# Patient Record
Sex: FEMALE | Race: WHITE | Employment: UNEMPLOYED | URBAN - METROPOLITAN AREA
[De-identification: names, ages, dates, MRNs, and addresses within clinical notes are randomized per-mention and may not be internally consistent; named-entity substitution may affect disease eponyms.]

---

## 2020-03-16 ENCOUNTER — PATIENT OUTREACH (OUTPATIENT)
Dept: FAMILY MEDICINE CLINIC | Age: 25
End: 2020-03-16

## 2021-05-14 ENCOUNTER — OFFICE VISIT (OUTPATIENT)
Dept: FAMILY MEDICINE CLINIC | Age: 26
End: 2021-05-14
Payer: MEDICAID

## 2021-05-14 VITALS
TEMPERATURE: 98.6 F | HEIGHT: 63 IN | BODY MASS INDEX: 18.04 KG/M2 | OXYGEN SATURATION: 96 % | RESPIRATION RATE: 16 BRPM | HEART RATE: 70 BPM | WEIGHT: 101.8 LBS | SYSTOLIC BLOOD PRESSURE: 118 MMHG | DIASTOLIC BLOOD PRESSURE: 67 MMHG

## 2021-05-14 DIAGNOSIS — F32.0 CURRENT MILD EPISODE OF MAJOR DEPRESSIVE DISORDER WITHOUT PRIOR EPISODE (HCC): ICD-10-CM

## 2021-05-14 DIAGNOSIS — F41.1 GENERALIZED ANXIETY DISORDER: Primary | ICD-10-CM

## 2021-05-14 PROCEDURE — 36415 COLL VENOUS BLD VENIPUNCTURE: CPT | Performed by: FAMILY MEDICINE

## 2021-05-14 PROCEDURE — 99213 OFFICE O/P EST LOW 20 MIN: CPT | Performed by: FAMILY MEDICINE

## 2021-05-14 RX ORDER — VENLAFAXINE HYDROCHLORIDE 37.5 MG/1
37.5 CAPSULE, EXTENDED RELEASE ORAL DAILY
Qty: 60 CAP | Refills: 2 | Status: SHIPPED | OUTPATIENT
Start: 2021-05-14 | End: 2021-07-06

## 2021-05-14 NOTE — PROGRESS NOTES
1. Have you been to the ER, urgent care clinic since your last visit? Hospitalized since your last visit? No    2. Have you seen or consulted any other health care providers outside of the [address] [place] [address] since your last visit? Include any pap smears or colon screening. No     5/14/2021      Chief Complaint   Patient presents with    Anxiety     having trouble with day to day chores and parenting, feels grouchy and irritated         History of Present Illness:         is a 31 y.o. female with increasing nervous irritability, labile mood and anxiety. No depressed mood, no suicidal ideation. No previous episodes. Sleep OK, appetite poor. Libido depressed. Allergies   Allergen Reactions    Caffeine Anxiety     shaky    Doxycycline Other (comments)     Numbness/tingling and skin sensitivity from elbows and knees down. Current Outpatient Medications   Medication Sig    venlafaxine-SR (EFFEXOR-XR) 37.5 mg capsule Take 1 Cap by mouth daily. After two weeks increase to two tabs    norethindrone (MICRONOR) 0.35 mg tab Take  by mouth. No current facility-administered medications for this visit. Physical Examination:    Visit Vitals  /67 (BP 1 Location: Left upper arm, BP Patient Position: Sitting, BP Cuff Size: Adult)   Pulse 70   Temp 98.6 °F (37 °C) (Temporal)   Resp 16   Ht 5' 3\" (1.6 m)   Wt 101 lb 12.8 oz (46.2 kg)   LMP 05/05/2021   SpO2 96%   BMI 18.03 kg/m²      General:  Alert, cooperative, no distress. HEENT:  Normocephalic, without obvious abnormality, atraumatic. Conjunctivae/corneas clear. Pupils equal, round, reactive to light. Extraocular movements intact. TMs and external canals normal bilaterally. Nasal mucosa and oropharynx clear. Lungs: Clear to auscultation bilaterally. Chest wall:  No tenderness or deformity. Heart:  Regular rate and rhythm, S1, S2 normal, no murmur, click, rub, or gallop. Abdomen:   Soft, non-tender.  Bowel sounds normal. No masses. No organomegaly. Extremities: Extremities normal, atraumatic, no cyanosis or edema. Pulses: 2+ and symmetric all extremities. Skin: Skin color, texture, turgor normal. No rashes or lesions. Lymph nodes: Cervical, supraclavicular, and axillary nodes normal.   Neurologic: CNII-XII intact. Normal strength, sensation, and reflexes throughout. ASSESSMENT AND PLAN    1. Generalized anxiety disorder  Medication trial. Virtual follow up in 6-8 weeks. - venlafaxine-SR (EFFEXOR-XR) 37.5 mg capsule; Take 1 Cap by mouth daily. After two weeks increase to two tabs  Dispense: 60 Cap; Refill: 2  - [place] HANDLG&/OR CONVEY OF SPEC FOR TR OFFICE TO LAB  - COLLECTION VENOUS BLOOD,VENIPUNCTURE    2. Current mild episode of major depressive disorder without prior episode (HCC)    - venlafaxine-SR (EFFEXOR-XR) 37.5 mg capsule; Take 1 Cap by mouth daily. After two weeks increase to two tabs  Dispense: 60 Cap; Refill: 2  - TSH 3RD GENERATION; Future  - CBC WITH AUTOMATED DIFF; Future  - METABOLIC PANEL, COMPREHENSIVE; Future  - METABOLIC PANEL, COMPREHENSIVE  - CBC WITH AUTOMATED DIFF  - TSH 3RD GENERATION            Orders Placed This Encounter    TSH 3RD GENERATION     Standing Status:   Future     Number of Occurrences:   1     Standing Expiration Date:   5/14/2022    CBC WITH AUTOMATED DIFF     Standing Status:   Future     Number of Occurrences:   1     Standing Expiration Date:   6/46/0199    METABOLIC PANEL, COMPREHENSIVE     Standing Status:   Future     Number of Occurrences:   1     Standing Expiration Date:   5/14/2022    NV HANDLG&/OR CONVEY OF SPEC FOR TR OFFICE TO LAB    COLLECTION VENOUS BLOOD,VENIPUNCTURE    venlafaxine-SR (EFFEXOR-XR) 37.5 mg capsule     Sig: Take 1 Cap by mouth daily.  After two weeks increase to two tabs     Dispense:  60 Cap     Refill:  2       8 weeks    Toya Rushing MD

## 2021-05-15 LAB
ALBUMIN SERPL-MCNC: 4.4 G/DL (ref 3.5–5)
ALBUMIN/GLOB SERPL: 1.4 {RATIO} (ref 1.1–2.2)
ALP SERPL-CCNC: 76 U/L (ref 45–117)
ALT SERPL-CCNC: 10 U/L (ref 12–78)
ANION GAP SERPL CALC-SCNC: 4 MMOL/L (ref 5–15)
AST SERPL-CCNC: 15 U/L (ref 15–37)
BASOPHILS # BLD: 0 K/UL (ref 0–0.1)
BASOPHILS NFR BLD: 1 % (ref 0–1)
BILIRUB SERPL-MCNC: 0.5 MG/DL (ref 0.2–1)
BUN SERPL-MCNC: 6 MG/DL (ref 6–20)
BUN/CREAT SERPL: 10 (ref 12–20)
CALCIUM SERPL-MCNC: 9 MG/DL (ref 8.5–10.1)
CHLORIDE SERPL-SCNC: 108 MMOL/L (ref 97–108)
CO2 SERPL-SCNC: 28 MMOL/L (ref 21–32)
CREAT SERPL-MCNC: 0.58 MG/DL (ref 0.55–1.02)
DIFFERENTIAL METHOD BLD: NORMAL
EOSINOPHIL # BLD: 0.2 K/UL (ref 0–0.4)
EOSINOPHIL NFR BLD: 3 % (ref 0–7)
ERYTHROCYTE [DISTWIDTH] IN BLOOD BY AUTOMATED COUNT: 12.7 % (ref 11.5–14.5)
GLOBULIN SER CALC-MCNC: 3.2 G/DL (ref 2–4)
GLUCOSE SERPL-MCNC: 101 MG/DL (ref 65–100)
HCT VFR BLD AUTO: 38.9 % (ref 35–47)
HGB BLD-MCNC: 12.7 G/DL (ref 11.5–16)
IMM GRANULOCYTES # BLD AUTO: 0 K/UL (ref 0–0.04)
IMM GRANULOCYTES NFR BLD AUTO: 0 % (ref 0–0.5)
LYMPHOCYTES # BLD: 1.9 K/UL (ref 0.8–3.5)
LYMPHOCYTES NFR BLD: 33 % (ref 12–49)
MCH RBC QN AUTO: 30 PG (ref 26–34)
MCHC RBC AUTO-ENTMCNC: 32.6 G/DL (ref 30–36.5)
MCV RBC AUTO: 92 FL (ref 80–99)
MONOCYTES # BLD: 0.6 K/UL (ref 0–1)
MONOCYTES NFR BLD: 10 % (ref 5–13)
NEUTS SEG # BLD: 3.1 K/UL (ref 1.8–8)
NEUTS SEG NFR BLD: 53 % (ref 32–75)
NRBC # BLD: 0 K/UL (ref 0–0.01)
NRBC BLD-RTO: 0 PER 100 WBC
PLATELET # BLD AUTO: 285 K/UL (ref 150–400)
PMV BLD AUTO: 11.3 FL (ref 8.9–12.9)
POTASSIUM SERPL-SCNC: 3.7 MMOL/L (ref 3.5–5.1)
PROT SERPL-MCNC: 7.6 G/DL (ref 6.4–8.2)
RBC # BLD AUTO: 4.23 M/UL (ref 3.8–5.2)
SODIUM SERPL-SCNC: 140 MMOL/L (ref 136–145)
TSH SERPL DL<=0.05 MIU/L-ACNC: 1.35 UIU/ML (ref 0.36–3.74)
WBC # BLD AUTO: 5.8 K/UL (ref 3.6–11)

## 2021-05-17 ENCOUNTER — TELEPHONE (OUTPATIENT)
Dept: FAMILY MEDICINE CLINIC | Age: 26
End: 2021-05-17

## 2021-05-17 NOTE — TELEPHONE ENCOUNTER
Patient calls to report that she has been taking the 37.5 mg of Effexor. She has felt like garbage everyday since she started it. She feels like she is in a fog, has had a headache/migraine. She has not been able to get out of the bed for 3 days. She wants to know if this is normal and should she continue taking it. I have talked to Dr Benigno Dill about this situation. He wants the patient to try to continue the medication for the next 3 days to see if the SE's go away. If not, she should call the office back and he will consider changing the RX.

## 2021-07-06 ENCOUNTER — VIRTUAL VISIT (OUTPATIENT)
Dept: FAMILY MEDICINE CLINIC | Age: 26
End: 2021-07-06
Payer: MEDICAID

## 2021-07-06 DIAGNOSIS — F32.0 CURRENT MILD EPISODE OF MAJOR DEPRESSIVE DISORDER WITHOUT PRIOR EPISODE (HCC): ICD-10-CM

## 2021-07-06 DIAGNOSIS — F41.1 GENERALIZED ANXIETY DISORDER: ICD-10-CM

## 2021-07-06 PROCEDURE — 99442 PR PHYS/QHP TELEPHONE EVALUATION 11-20 MIN: CPT | Performed by: FAMILY MEDICINE

## 2021-07-06 RX ORDER — VENLAFAXINE HYDROCHLORIDE 37.5 MG/1
37.5 CAPSULE, EXTENDED RELEASE ORAL DAILY
Qty: 90 CAPSULE | Refills: 1 | Status: SHIPPED | OUTPATIENT
Start: 2021-07-06 | End: 2022-01-07 | Stop reason: SDUPTHER

## 2021-07-06 NOTE — PROGRESS NOTES
Surinder Nazario is a 31 y.o. female, evaluated via audio-only technology on 7/6/2021 for Anxiety (2 month follow up )  . Assessment & Plan:   Diagnoses and all orders for this visit:    1. Generalized anxiety disorder  -     venlafaxine-SR (EFFEXOR-XR) 37.5 mg capsule; Take 1 Capsule by mouth daily. 2. Current mild episode of major depressive disorder without prior episode (HCC)  -     venlafaxine-SR (EFFEXOR-XR) 37.5 mg capsule; Take 1 Capsule by mouth daily. Continue low dose SSNRI for 6-12 months    12Cont  Subjective: Following up anxiety and depression after six weeks on venlafaxine. Feeling much better and did not have to increase to 75 mg. No side effects after some initial queeziness. Prior to Admission medications    Medication Sig Start Date End Date Taking? Authorizing Provider   venlafaxine-SR [facility] 37.5 mg capsule Take 1 Capsule by mouth daily. 7/6/21  Yes Amarilys Acevedo MD   norethindrone [facility]) 0.35 mg tab Take  by mouth. Yes Provider, Historical     There is no problem list on file for this patient. Current Outpatient Medications   Medication Sig Dispense Refill    venlafaxine-SR (EFFEXOR-XR) 37.5 mg capsule Take 1 Capsule by mouth daily. 90 Capsule 1    norethindrone (MICRONOR) 0.35 mg tab Take  by mouth. Allergies   Allergen Reactions    Caffeine Anxiety     shaky    Doxycycline Other (comments)     Numbness/tingling and skin sensitivity from elbows and knees down. ROS    No flowsheet data found. Surinder Nazario, who was evaluated through a patient-initiated, synchronous (real-time) audio only encounter, and/or her healthcare decision maker, is aware that it is a billable service, with coverage as determined by her insurance carrier. She provided verbal consent to proceed: Yes. She has not had a related appointment within my department in the past 7 days or scheduled within the next 24 hours.       Total Time: minutes: 11-20 minutes    Petra An Darin Lai MD 1. Have you been to the ER, urgent care clinic since your last visit? Hospitalized since your last visit? No    2. Have you seen or consulted any other health care providers outside of the [address] [place] [address] since your last visit? Include any pap smears or colon screening.  No

## 2021-12-30 ENCOUNTER — VIRTUAL VISIT (OUTPATIENT)
Dept: FAMILY MEDICINE CLINIC | Age: 26
End: 2021-12-30
Payer: MEDICAID

## 2021-12-30 DIAGNOSIS — J01.10 ACUTE NON-RECURRENT FRONTAL SINUSITIS: Primary | ICD-10-CM

## 2021-12-30 PROCEDURE — 99213 OFFICE O/P EST LOW 20 MIN: CPT | Performed by: PHYSICIAN ASSISTANT

## 2021-12-30 RX ORDER — AMOXICILLIN AND CLAVULANATE POTASSIUM 875; 125 MG/1; MG/1
1 TABLET, FILM COATED ORAL EVERY 12 HOURS
Qty: 14 TABLET | Refills: 0 | Status: SHIPPED | OUTPATIENT
Start: 2021-12-30 | End: 2022-01-06

## 2021-12-30 NOTE — PROGRESS NOTES
Marjorie Cohn is a 31 y.o. female who was seen by synchronous (real-time) audio-video technology on 12/30/2021 for Cough (x 1 week, congestion, dry, itchy throat, sinus ha, afebrile)        Assessment & Plan:   Diagnoses and all orders for this visit:    1. Acute non-recurrent frontal sinusitis  -     amoxicillin-clavulanate (AUGMENTIN) 875-125 mg per tablet; Take 1 Tablet by mouth every twelve (12) hours for 7 days. Encourage rest & fluids. Humidifier, warm compresses, nasal spray, decongestants. Discussed otc medications for symptomatic relief. RTO/seek medical attn if sxs persist/worsen or develops any additional sxs/concerns. The complexity of medical decision making for this visit is moderate         I spent at least 15 minutes on this visit with this established patient. Subjective:   Pt presents with 1 week, started last Mon-Wed, tongue/throat itchy and watched to scratch it. (though allergic rxn at first)  Then took Benadryl. fetl okay next day. A lot of blowing nose. Friday found out grandma had covid that her son had stayed with prior. Pt took at home COVID test Sat on [place] and it was negative. Suspects she has a sinus infection. Low of blowing yellow from nose. Had sinus HA earlier this morning. Sinus pressure and pain in eyes and right above eyebrows. Nose burning. Neti pot saline rinse helped a lot. Flonase also helping. Prior to Admission medications    Medication Sig Start Date End Date Taking? Authorizing Provider   venlafaxine-SR [facility] 37.5 mg capsule Take 1 Capsule by mouth daily. 7/6/21  Yes Og Barnes MD   [facility]) 0.35 mg tab Take  by mouth. Patient not taking: Reported on 12/30/2021    Provider, Historical     There is no problem list on file for this patient. There are no problems to display for this patient.     Current Outpatient Medications   Medication Sig Dispense Refill    amoxicillin-clavulanate (AUGMENTIN) 875-125 mg per tablet Take 1 Tablet by mouth every twelve (12) hours for 7 days. 14 Tablet 0    venlafaxine-SR (EFFEXOR-XR) 37.5 mg capsule Take 1 Capsule by mouth daily. 90 Capsule 1    norethindrone (MICRONOR) 0.35 mg tab Take  by mouth. (Patient not taking: Reported on 12/30/2021)       Allergies   Allergen Reactions    Caffeine Anxiety     shaky    Doxycycline Other (comments)     Numbness/tingling and skin sensitivity from elbows and knees down. No past medical history on file. Past Surgical History:   Procedure Laterality Date    HX ORTHOPAEDIC Left     fatty cyst on hip     Family History   Problem Relation Age of Onset    Cancer Father         prostate    Alcohol abuse Father     Cancer Maternal Grandmother         breast     Social History     Tobacco Use    Smoking status: Never Smoker    Smokeless tobacco: Never Used   Substance Use Topics    Alcohol use: Yes       Just had a miscarriage last week. Review of Systems   Constitutional: Negative for chills and fever. HENT: Positive for congestion and sinus pain. Negative for ear pain and sore throat (only in AM from mouth breathing. ). Respiratory: Negative for cough and shortness of breath. Gastrointestinal: Negative. Musculoskeletal: Negative for myalgias. Neurological: Positive for headaches. Negative for dizziness. Objective:   No flowsheet data found.      [INSTRUCTIONS:  \"[x]\" Indicates a positive item  \"[]\" Indicates a negative item  -- DELETE ALL ITEMS NOT EXAMINED]    Constitutional: [x] Appears well-developed and well-nourished [x] No apparent distress      [] Abnormal -     Mental status: [x] Alert and awake  [x] Oriented to person/place/time [x] Able to follow commands    [] Abnormal -     Eyes:   EOM    [x]  Normal    [] Abnormal -   Sclera  [x]  Normal    [] Abnormal -          Discharge [x]  None visible   [] Abnormal -     HENT: [x] Normocephalic, atraumatic  [x] Abnormal - pain when palpates over frontal sinuses. [x] Mouth/Throat: Mucous membranes are moist    External Ears [x] Normal  [] Abnormal -    Neck: [x] No visualized mass [] Abnormal -     Pulmonary/Chest: [x] Respiratory effort normal   [x] No visualized signs of difficulty breathing or respiratory distress        [] Abnormal -      Musculoskeletal:   [x] Normal gait with no signs of ataxia         [x] Normal range of motion of neck        [] Abnormal -     Neurological:        [x] No Facial Asymmetry (Cranial nerve 7 motor function) (limited exam due to video visit)          [x] No gaze palsy        [] Abnormal -          Skin:        [x] No significant exanthematous lesions or discoloration noted on facial skin         [] Abnormal -            Psychiatric:       [x] Normal Affect [] Abnormal -        [x] No Hallucinations    Other pertinent observable physical exam findings:-        We discussed the expected course, resolution and complications of the diagnosis(es) in detail. Medication risks, benefits, costs, interactions, and alternatives were discussed as indicated. I advised her to contact the office if her condition worsens, changes or fails to improve as anticipated. She expressed understanding with the diagnosis(es) and plan. Sergio Garcia, was evaluated through a synchronous (real-time) audio-video encounter. The patient (or guardian if applicable) is aware that this is a billable service. Verbal consent to proceed has been obtained within the past 12 months. The visit was conducted pursuant to the emergency declaration under the [facility], [address] [place] [address] authority and the [employer] General Act. Patient identification was verified, and a caregiver was present when appropriate. The patient was located in a state where the provider was credentialed to provide care.       Kamala Reagan PA-C

## 2021-12-30 NOTE — PATIENT INSTRUCTIONS
Sinusitis: Care Instructions  Your Care Instructions     Sinusitis is an infection of the lining of the sinus cavities in your head. Sinusitis often follows a cold. It causes pain and pressure in your head and face. In most cases, sinusitis gets better on its own in 1 to 2 weeks. But some mild symptoms may last for several weeks. Sometimes antibiotics are needed. Follow-up care is a key part of your treatment and safety. Be sure to make and go to all appointments, and call your doctor if you are having problems. It's also a good idea to know your test results and keep a list of the medicines you take. How can you care for yourself at home? · Take an over-the-counter pain medicine, such as acetaminophen (Tylenol), ibuprofen (Advil, Motrin), or naproxen (Aleve). Read and follow all instructions on the label. · If the doctor prescribed antibiotics, take them as directed. Do not stop taking them just because you feel better. You need to take the full course of antibiotics. · Be careful when taking over-the-counter cold or flu medicines and Tylenol at the same time. Many of these medicines have acetaminophen, which is Tylenol. Read the labels to make sure that you are not taking more than the recommended dose. Too much acetaminophen (Tylenol) can be harmful. · Breathe warm, moist air from a steamy shower, a hot bath, or a sink filled with hot water. Avoid cold, dry air. Using a humidifier in your home may help. Follow the directions for cleaning the machine. · Use saline (saltwater) nasal washes. This can help keep your nasal passages open and wash out mucus and bacteria. You can buy saline nose drops at a grocery store or drugstore. Or you can make your own at home by adding 1 teaspoon of salt and 1 teaspoon of baking soda to 2 cups of distilled water. If you make your own, fill a bulb syringe with the solution, insert the tip into your nostril, and squeeze gently. Rometta Mering your nose.   · Put a hot, wet towel or a warm gel pack on your face 3 or 4 times a day for 5 to 10 minutes each time. · Try a decongestant nasal spray like oxymetazoline (Afrin). Do not use it for more than 3 days in a row. Using it for more than 3 days can make your congestion worse. When should you call for help? Call your doctor now or seek immediate medical care if:    · You have new or worse swelling or redness in your face or around your eyes.     · You have a new or higher fever. Watch closely for changes in your health, and be sure to contact your doctor if:    · You have new or worse facial pain.     · The mucus from your nose becomes thicker (like pus) or has new blood in it.     · You are not getting better as expected. Where can you learn more? Go to http://www.gray.com/  Enter B566 in the search box to learn more about \"Sinusitis: Care Instructions. \"  Current as of: December 2, 2020               Content Version: 13.0  © 2006-2021 [employer]. Care instructions adapted under license by [employer] (which disclaims liability or warranty for this information). If you have questions about a medical condition or this instruction, always ask your healthcare professional. [place] [address] any warranty or liability for your use of this information.

## 2022-01-07 DIAGNOSIS — F41.1 GENERALIZED ANXIETY DISORDER: ICD-10-CM

## 2022-01-07 DIAGNOSIS — F32.0 CURRENT MILD EPISODE OF MAJOR DEPRESSIVE DISORDER WITHOUT PRIOR EPISODE (HCC): ICD-10-CM

## 2022-01-07 RX ORDER — VENLAFAXINE HYDROCHLORIDE 37.5 MG/1
37.5 CAPSULE, EXTENDED RELEASE ORAL DAILY
Qty: 90 CAPSULE | Refills: 1 | Status: SHIPPED | OUTPATIENT
Start: 2022-01-07 | End: 2022-06-22 | Stop reason: ALTCHOICE

## 2022-01-07 NOTE — TELEPHONE ENCOUNTER
Pt calls for   Requested Prescriptions     Pending Prescriptions Disp Refills    venlafaxine-SR (EFFEXOR-XR) 37.5 mg capsule 90 Capsule 1     Sig: Take 1 Capsule by mouth daily. Pt thought she still had refills available.

## 2022-06-22 ENCOUNTER — OFFICE VISIT (OUTPATIENT)
Dept: FAMILY MEDICINE CLINIC | Age: 27
End: 2022-06-22
Payer: MEDICAID

## 2022-06-22 VITALS
WEIGHT: 109 LBS | TEMPERATURE: 97 F | HEIGHT: 63 IN | SYSTOLIC BLOOD PRESSURE: 108 MMHG | OXYGEN SATURATION: 97 % | RESPIRATION RATE: 18 BRPM | HEART RATE: 95 BPM | DIASTOLIC BLOOD PRESSURE: 71 MMHG | BODY MASS INDEX: 19.31 KG/M2

## 2022-06-22 DIAGNOSIS — F32.0 CURRENT MILD EPISODE OF MAJOR DEPRESSIVE DISORDER WITHOUT PRIOR EPISODE (HCC): ICD-10-CM

## 2022-06-22 DIAGNOSIS — F41.1 GENERALIZED ANXIETY DISORDER: Primary | ICD-10-CM

## 2022-06-22 PROCEDURE — 99213 OFFICE O/P EST LOW 20 MIN: CPT | Performed by: PHYSICIAN ASSISTANT

## 2022-06-22 RX ORDER — DESVENLAFAXINE 25 MG/1
25 TABLET, EXTENDED RELEASE ORAL DAILY
Qty: 30 TABLET | Refills: 0 | Status: SHIPPED | OUTPATIENT
Start: 2022-06-22 | End: 2022-08-02 | Stop reason: SDUPTHER

## 2022-06-22 NOTE — PROGRESS NOTES
Katie Clay is a 31 y.o. female who presents to the office today with the following:  Chief Complaint   Patient presents with    Follow-up     patient is pregnant    Anxiety       HPI   Pt here to discuss continuing or switching her Effexor now that she is pregnant. She says her anxiety has always been pretty well-controlled on her current regimen, but has some concerns of the potential safety in taking it to her baby and also the potential effects to the baby after it is born. She has discussed this with her OBGYN and they have talked about her weaning off or possibly switching to something like sertraline by her third trimester. She is not fully decided if she prefer to d/c completely or switch, but does want to get off the Effexor. She has concerns of withdrawal symptoms. She otherwise reports feeling well, says the pregnancy is also going well. She has no other complaints or symptoms at this time. She denies depression or other psych sxs as well. Review of Systems   Psychiatric/Behavioral: Negative for depression, hallucinations, memory loss, substance abuse and suicidal ideas. The patient is not nervous/anxious and does not have insomnia. See HPI. History reviewed. No pertinent past medical history. Past Surgical History:   Procedure Laterality Date    HX ORTHOPAEDIC Left     fatty cyst on hip       Allergies   Allergen Reactions    Caffeine Anxiety     shaky    Doxycycline Other (comments)     Numbness/tingling and skin sensitivity from elbows and knees down. Current Outpatient Medications   Medication Sig    -DTRM fum-folic acid 29 mg iron- 1 mg tab prenatal vitamins no.119-iron fumarate 29 mg-folic acid 1 mg tablet   Take by oral route.  desvenlafaxine succinate (Pristiq) 25 mg ER tablet Take 1 Tablet by mouth daily. No current facility-administered medications for this visit.        Social History     Socioeconomic History    Marital status: SINGLE   Tobacco Use    Smoking status: Never Smoker    Smokeless tobacco: Never Used   Vaping Use    Vaping Use: Never used   Substance and Sexual Activity    Alcohol use: Yes    Drug use: Never    Sexual activity: Yes     Partners: Male       Family History   Problem Relation Age of Onset    Cancer Father         prostate    Alcohol abuse Father     Cancer Maternal Grandmother         breast         Physical Exam:  Visit Vitals  /71 (BP 1 Location: Left upper arm, BP Patient Position: Sitting, BP Cuff Size: Adult)   Pulse 95   Temp 97 °F (36.1 °C) (Temporal)   Resp 18   Ht 5' 3\" (1.6 m)   Wt 109 lb (49.4 kg)   LMP 05/17/2022 (Exact Date)   SpO2 97%   BMI 19.31 kg/m²     Physical Exam  Vitals and nursing note reviewed. Constitutional:       Appearance: Normal appearance. HENT:      Head: Normocephalic and atraumatic. Eyes:      Conjunctiva/sclera: Conjunctivae normal.   Cardiovascular:      Rate and Rhythm: Normal rate and regular rhythm. Pulses: Normal pulses. Heart sounds: Normal heart sounds. Pulmonary:      Effort: Pulmonary effort is normal.      Breath sounds: Normal breath sounds. Abdominal:      Palpations: Abdomen is soft. Musculoskeletal:      Cervical back: Neck supple. Skin:     General: Skin is warm and dry. Neurological:      Mental Status: She is alert and oriented to person, place, and time. Psychiatric:         Mood and Affect: Mood normal.         Behavior: Behavior normal.         Thought Content: Thought content normal.         Judgment: Judgment normal.         Assessment/Plan:    ICD-10-CM ICD-9-CM    1. Generalized anxiety disorder  F41.1 300.02 desvenlafaxine succinate (Pristiq) 25 mg ER tablet   2. Current mild episode of major depressive disorder without prior episode ([facility])  F32.0 296.21 desvenlafaxine succinate (Pristiq) 25 mg ER tablet   discussed switching to a similar medication that may not have as strong withdrawal. Pt agrees.  She will plan to further discuss with OBGYN at her next apt when she will decide on whether to go to sertraline or come off completely before her third trimester. She will notify me if any further issues or if further management/change is needed, otherwise OBGYN may be taking over treatment for now. She will rtc/Seek care in interim for any new/worsening sxs or other concerns. Pt verbalizes understanding and agrees with the plan.     Norah Arguello PA-C

## 2022-06-22 NOTE — PROGRESS NOTES
1. \"Have you been to the ER, urgent care clinic since your last visit? Hospitalized since your last visit? \" No    2. \"Have you seen or consulted any other health care providers outside of the [address] [place] [address] since your last visit? \" Yes [facility]    3. For patients aged 39-70: Has the patient had a colonoscopy / FIT/ Cologuard? NA - based on age      If the patient is female:    4. For patients aged 41-77: Has the patient had a mammogram within the past 2 years? NA - based on age or sex      11. For patients aged 21-65: Has the patient had a pap smear?  Yes - no Care Gap present

## 2022-07-18 ENCOUNTER — HOSPITAL ENCOUNTER (EMERGENCY)
Age: 27
Discharge: HOME | End: 2022-07-18
Attending: EMERGENCY MEDICINE
Payer: MEDICAID

## 2022-07-18 VITALS
WEIGHT: 108 LBS | OXYGEN SATURATION: 99 % | SYSTOLIC BLOOD PRESSURE: 118 MMHG | DIASTOLIC BLOOD PRESSURE: 70 MMHG | BODY MASS INDEX: 19.88 KG/M2 | TEMPERATURE: 99 F | HEIGHT: 62 IN | RESPIRATION RATE: 18 BRPM | HEART RATE: 85 BPM

## 2022-07-18 DIAGNOSIS — O20.0 THREATENED MISCARRIAGE: Primary | ICD-10-CM

## 2022-07-18 LAB
APPEARANCE UR: CLEAR
BILIRUB UR QL: NEGATIVE
COLOR UR: NORMAL
GLUCOSE UR STRIP.AUTO-MCNC: NEGATIVE MG/DL
HCG SERPL-ACNC: ABNORMAL MIU/ML (ref 0–6)
HGB UR QL STRIP: NEGATIVE
KETONES UR QL STRIP.AUTO: NEGATIVE MG/DL
LEUKOCYTE ESTERASE UR QL STRIP.AUTO: NEGATIVE
NITRITE UR QL STRIP.AUTO: NEGATIVE
PH UR STRIP: 7 [PH] (ref 5–8)
PROT UR STRIP-MCNC: NEGATIVE MG/DL
SP GR UR REFRACTOMETRY: <1.005 (ref 1–1.03)
UROBILINOGEN UR QL STRIP.AUTO: 0.2 EU/DL (ref 0.2–1)

## 2022-07-18 PROCEDURE — 99283 EMERGENCY DEPT VISIT LOW MDM: CPT

## 2022-07-18 PROCEDURE — 81003 URINALYSIS AUTO W/O SCOPE: CPT

## 2022-07-18 PROCEDURE — 36415 COLL VENOUS BLD VENIPUNCTURE: CPT

## 2022-07-18 PROCEDURE — 84702 CHORIONIC GONADOTROPIN TEST: CPT

## 2022-07-18 NOTE — ED TRIAGE NOTES
Pt arrived by POV with possible miscarriage. Pt reports she was seen by her ob/gyn on the 12th and told they could not find a heart beat on U/S and that her levels were low, pt reports she had estimated she was 8 weeks but was told she was 6 weeks. Pt denies vaginal bleeding but does report she is having lower abd cramping 3/10.   Pt is awake alert and oriented x 4, pt educated on Er flow

## 2022-07-18 NOTE — Clinical Note
[address] [place] [address] EMERGENCY DEP  [address] [place] [address] Dr Billy Basilio [id number]  [phone]    Work/School Note    Date: 7/18/2022    To Whom It May concern: Trey Mercado was seen and treated today in the emergency room by the following provider(s):  Attending Provider: Enio Rios MD.      Trey Mercado is excused from work/school on 07/18/22. She is clear to return to work/school on 07/19/22. Sincerely,          Sergo Santos MD

## 2022-07-18 NOTE — DISCHARGE INSTRUCTIONS
You were seen in the emergency room for pelvic pain and pregnancy. Your hCG levels of hormone in your blood are decreasing from the level last Tuesday. You will likely have a miscarriage of this pregnancy. If you develop severe one-sided pelvic pain, please return to the emergency room. Otherwise, follow-up with Dr. April Steve in the office.

## 2022-07-18 NOTE — ED PROVIDER NOTES
EMERGENCY DEPARTMENT HISTORY AND PHYSICAL EXAM          Date: 7/18/2022  Patient Name: Rhona Singer    History of Presenting Illness     Chief Complaint   Patient presents with    Miscarriage       History Provided By: Patient    HPI: Rhona Singer is a 31 y.o. female, doubt significant history, presents ambulatory to the ER for pelvic pain. Patient states since [place] she has had very irregular periods. She had an intake exam with OB last Tuesday with EDC expected at 8 weeks but on ultrasound was only measuring 6 weeks without a heartbeat so they dave her hCG level (21848) and have her to return 2 days later. On Thursday her hCG level had dropped to 500 points so they called her this morning to let her know. They were unable to get her an appointment in the clinic so she came to the ER if she is concerned about developing an infection if she is having a miscarriage and not seeing tissue. Patient denies any fevers, chills, nausea or vomiting but notes she is having some intermittent pelvic cramping which is rated currently at a 3/10. PCP: Marlana Gowers, PA-C    Allergies: Doxycycline  Social Hx: -tobacco, +EtOH, -Illicit Drugs    There are no other complaints, changes, or physical findings at this time. Current Outpatient Medications   Medication Sig Dispense Refill    -JNXL fum-folic acid 29 mg iron- 1 mg tab prenatal vitamins no.119-iron fumarate 29 mg-folic acid 1 mg tablet   Take by oral route.  desvenlafaxine succinate (Pristiq) 25 mg ER tablet Take 1 Tablet by mouth daily. 30 Tablet 0       Past History     Past Medical History:  History reviewed. No pertinent past medical history.     Past Surgical History:  Past Surgical History:   Procedure Laterality Date    HX ORTHOPAEDIC Left     fatty cyst on hip       Family History:  Family History   Problem Relation Age of Onset    Cancer Father         prostate    Alcohol abuse Father     Cancer Maternal Grandmother         breast Social History:  Social History     Tobacco Use    Smoking status: Never Smoker    Smokeless tobacco: Never Used   Vaping Use    Vaping Use: Never used   Substance Use Topics    Alcohol use: Yes     Comment: occasional    Drug use: Never       Allergies: Allergies   Allergen Reactions    Caffeine Anxiety     shaky    Doxycycline Other (comments)     Numbness/tingling and skin sensitivity from elbows and knees down. Review of Systems   Review of Systems   Constitutional: Negative for activity change, appetite change, chills, fever and unexpected weight change. HENT: Negative for congestion. Eyes: Negative for pain and visual disturbance. Respiratory: Negative for cough and shortness of breath. Cardiovascular: Negative for chest pain. Gastrointestinal: Negative for abdominal pain, diarrhea, nausea and vomiting. Genitourinary: Positive for pelvic pain (Intermittent cramping). Negative for dysuria. Musculoskeletal: Negative for back pain. Skin: Negative for rash. Neurological: Negative for headaches. Physical Exam   Physical Exam  Vitals and nursing note reviewed. Constitutional:       Appearance: She is well-developed. She is not diaphoretic. Comments: This is a young, thin female, sitting comfortably, with normal vital signs except for a temperature of 99 °F   HENT:      Head: Normocephalic and atraumatic. Eyes:      General:         Right eye: No discharge. Left eye: No discharge. Conjunctiva/sclera: Conjunctivae normal.      Pupils: Pupils are equal, round, and reactive to light. Cardiovascular:      Rate and Rhythm: Normal rate and regular rhythm. Heart sounds: Normal heart sounds. No murmur heard. Pulmonary:      Effort: Pulmonary effort is normal. No respiratory distress. Breath sounds: Normal breath sounds. No wheezing or rales. Abdominal:      General: Bowel sounds are normal. There is no distension.       Palpations: Abdomen is soft. There is no mass. Tenderness: There is no abdominal tenderness. There is no guarding or rebound. Hernia: No hernia is present. Musculoskeletal:         General: Normal range of motion. Cervical back: Normal range of motion and neck supple. Right lower leg: No edema. Left lower leg: No edema. Skin:     General: Skin is warm and dry. Findings: No rash. Neurological:      Mental Status: She is alert and oriented to person, place, and time. Cranial Nerves: No cranial nerve deficit. Motor: No abnormal muscle tone. Diagnostic Study Results     Labs -     Recent Results (from the past 12 hour(s))   URINALYSIS W/ RFLX MICROSCOPIC    Collection Time: 07/18/22 10:57 AM   Result Value Ref Range    Color YELLOW/STRAW      Appearance CLEAR CLEAR      Specific gravity <1.005 1.003 - 1.030    pH (UA) 7.0 5.0 - 8.0      Protein Negative NEG mg/dL    Glucose Negative NEG mg/dL    Ketone Negative NEG mg/dL    Bilirubin Negative NEG      Blood Negative NEG      Urobilinogen 0.2 0.2 - 1.0 EU/dL    Nitrites Negative NEG      Leukocyte Esterase Negative NEG     BETA HCG, QT    Collection Time: 07/18/22 11:07 AM   Result Value Ref Range    Beta HCG, QT 23,627 (H) 0 - 6 MIU/ML       Radiologic Studies -   No orders to display     CT Results  (Last 48 hours)    None        CXR Results  (Last 48 hours)    None            Medical Decision Making   I am the first provider for this patient. I reviewed the vital signs, available nursing notes, past medical history, past surgical history, family history and social history. Vital Signs-Reviewed the patient's vital signs.   Patient Vitals for the past 12 hrs:   Temp Pulse Resp BP SpO2   07/18/22 1041 99 °F (37.2 °C) 85 18 118/70 99 %       Pulse Oximetry Analysis - 99% on RA    Records Reviewed: Nursing Notes, Old Medical Records and Previous Laboratory Studies  Records reviewed and she has been negative; hCG 24,795 on Tuesday and 29226 on Thursday    Provider Notes (Medical Decision Making):   MDM: Female presenting comfortably in no acute distress, concerned about pelvic infection if she is having a miscarriage and not passing tissue. Patient is afebrile but her temperature is 99 °F, otherwise her blood pressure and heart rate are normal.  We can send a repeat hCG level on her here but she will need to follow-up with Dr. Jassi Telles for further recommendations. At this point she has no evidence of SIRS/sepsis/endometriosis/ectopic pregnancy    ED Course:   Initial assessment performed. The patients presenting problems have been discussed, and they are in agreement with the care plan formulated and outlined with them. I have encouraged them to ask questions as they arise throughout their visit. ED Course as of 07/18/22 1231   Mon Jul 18, 2022   1203 Continue to await for hCG. Patient rechecked and states she is comfortable at this time and does not need anything. Apologized for delays. [JT]      ED Course User Index  [JT] Dawn Luke MD        Discharge note:  Pt re-evaluated and noted to be feeling better, ready for discharge. Updated pt on all final lab findings. Will follow up as instructed. All questions have been answered, pt voiced understanding and agreement with plan. Specific return precautions provided as well as instructions to return to the ED should sx worsen at any time. Vital signs stable for discharge. Critical Care Time:   0      Diagnosis     Clinical Impression:   1. Threatened miscarriage        PLAN:  1. Current Discharge Medication List        2.    Follow-up Information     Follow up With Specialties Details Why Chadwick Cornejo MD Obstetrics & Gynecology, Gynecology, Obstetrics Schedule an appointment as soon as possible for a visit  For follow-up [address] [place] [address]  [phone]      [address] [place] [address] Emergency Medicine  If symptoms worsen with fevers, body aches, and worsening lower abdominal pain [address] [facility] [address]  [phone]        Return to ED if worse     Disposition:  Home       Please note, this dictation was completed with [employer], the computer voice recognition software. Quite often unanticipated grammatical, syntax, homophones, and other interpretive errors are inadvertently transcribed by the computer software. Please disregard these errors. Please excuse any errors that have escaped final proof reading.

## 2022-07-24 ENCOUNTER — HOSPITAL ENCOUNTER (EMERGENCY)
Age: 27
Discharge: HOME | End: 2022-07-25
Attending: FAMILY MEDICINE
Payer: MEDICAID

## 2022-07-24 DIAGNOSIS — O03.9 MISCARRIAGE: Primary | ICD-10-CM

## 2022-07-24 PROCEDURE — 99284 EMERGENCY DEPT VISIT MOD MDM: CPT

## 2022-07-24 PROCEDURE — 80053 COMPREHEN METABOLIC PANEL: CPT

## 2022-07-24 PROCEDURE — 96375 TX/PRO/DX INJ NEW DRUG ADDON: CPT

## 2022-07-24 PROCEDURE — 85025 COMPLETE CBC W/AUTO DIFF WBC: CPT

## 2022-07-24 PROCEDURE — 84702 CHORIONIC GONADOTROPIN TEST: CPT

## 2022-07-24 PROCEDURE — 96374 THER/PROPH/DIAG INJ IV PUSH: CPT

## 2022-07-24 PROCEDURE — 36415 COLL VENOUS BLD VENIPUNCTURE: CPT

## 2022-07-24 RX ORDER — MORPHINE SULFATE 4 MG/ML
4 INJECTION INTRAVENOUS
Status: COMPLETED | OUTPATIENT
Start: 2022-07-24 | End: 2022-07-25

## 2022-07-24 RX ORDER — ONDANSETRON 2 MG/ML
4 INJECTION INTRAMUSCULAR; INTRAVENOUS
Status: COMPLETED | OUTPATIENT
Start: 2022-07-24 | End: 2022-07-25

## 2022-07-24 RX ORDER — KETOROLAC TROMETHAMINE 30 MG/ML
15 INJECTION, SOLUTION INTRAMUSCULAR; INTRAVENOUS
Status: COMPLETED | OUTPATIENT
Start: 2022-07-25 | End: 2022-07-25

## 2022-07-25 VITALS
DIASTOLIC BLOOD PRESSURE: 72 MMHG | OXYGEN SATURATION: 98 % | TEMPERATURE: 98.1 F | BODY MASS INDEX: 20.49 KG/M2 | SYSTOLIC BLOOD PRESSURE: 116 MMHG | HEART RATE: 75 BPM | RESPIRATION RATE: 16 BRPM | WEIGHT: 112 LBS

## 2022-07-25 LAB
ALBUMIN SERPL-MCNC: 3.9 G/DL (ref 3.5–5)
ALBUMIN/GLOB SERPL: 1.2 {RATIO} (ref 1.1–2.2)
ALP SERPL-CCNC: 70 U/L (ref 45–117)
ALT SERPL-CCNC: 11 U/L (ref 12–78)
ANION GAP SERPL CALC-SCNC: 9 MMOL/L (ref 5–15)
AST SERPL-CCNC: 24 U/L (ref 15–37)
BASOPHILS # BLD: 0.1 K/UL (ref 0–0.1)
BASOPHILS NFR BLD: 0 % (ref 0–1)
BILIRUB SERPL-MCNC: 0.3 MG/DL (ref 0.2–1)
BUN SERPL-MCNC: 6 MG/DL (ref 6–20)
BUN/CREAT SERPL: 10 (ref 12–20)
CALCIUM SERPL-MCNC: 9.2 MG/DL (ref 8.5–10.1)
CHLORIDE SERPL-SCNC: 104 MMOL/L (ref 97–108)
CO2 SERPL-SCNC: 27 MMOL/L (ref 21–32)
CREAT SERPL-MCNC: 0.61 MG/DL (ref 0.55–1.02)
DIFFERENTIAL METHOD BLD: ABNORMAL
EOSINOPHIL # BLD: 0.2 K/UL (ref 0–0.4)
EOSINOPHIL NFR BLD: 2 % (ref 0–7)
ERYTHROCYTE [DISTWIDTH] IN BLOOD BY AUTOMATED COUNT: 13 % (ref 11.5–14.5)
GLOBULIN SER CALC-MCNC: 3.2 G/DL (ref 2–4)
GLUCOSE SERPL-MCNC: 97 MG/DL (ref 65–100)
HCG SERPL-ACNC: 9994 MIU/ML (ref 0–6)
HCT VFR BLD AUTO: 32.4 % (ref 35–47)
HGB BLD-MCNC: 11.1 G/DL (ref 11.5–16)
IMM GRANULOCYTES # BLD AUTO: 0 K/UL (ref 0–0.04)
IMM GRANULOCYTES NFR BLD AUTO: 0 % (ref 0–0.5)
LYMPHOCYTES # BLD: 2.2 K/UL (ref 0.8–3.5)
LYMPHOCYTES NFR BLD: 19 % (ref 12–49)
MCH RBC QN AUTO: 29.4 PG (ref 26–34)
MCHC RBC AUTO-ENTMCNC: 34.3 G/DL (ref 30–36.5)
MCV RBC AUTO: 85.9 FL (ref 80–99)
MONOCYTES # BLD: 0.9 K/UL (ref 0–1)
MONOCYTES NFR BLD: 8 % (ref 5–13)
NEUTS SEG # BLD: 8 K/UL (ref 1.8–8)
NEUTS SEG NFR BLD: 71 % (ref 32–75)
NRBC # BLD: 0 K/UL (ref 0–0.01)
NRBC BLD-RTO: 0 PER 100 WBC
PLATELET # BLD AUTO: 296 K/UL (ref 150–400)
PMV BLD AUTO: 9.9 FL (ref 8.9–12.9)
POTASSIUM SERPL-SCNC: 3.7 MMOL/L (ref 3.5–5.1)
PROT SERPL-MCNC: 7.1 G/DL (ref 6.4–8.2)
RBC # BLD AUTO: 3.77 M/UL (ref 3.8–5.2)
SODIUM SERPL-SCNC: 140 MMOL/L (ref 136–145)
WBC # BLD AUTO: 11.4 K/UL (ref 3.6–11)

## 2022-07-25 PROCEDURE — 96374 THER/PROPH/DIAG INJ IV PUSH: CPT

## 2022-07-25 PROCEDURE — 74011250636 HC RX REV CODE- 250/636: Performed by: FAMILY MEDICINE

## 2022-07-25 PROCEDURE — 96361 HYDRATE IV INFUSION ADD-ON: CPT

## 2022-07-25 PROCEDURE — 96375 TX/PRO/DX INJ NEW DRUG ADDON: CPT

## 2022-07-25 PROCEDURE — 74011250637 HC RX REV CODE- 250/637: Performed by: FAMILY MEDICINE

## 2022-07-25 RX ORDER — HYDROCODONE BITARTRATE AND ACETAMINOPHEN 5; 325 MG/1; MG/1
2 TABLET ORAL
Status: COMPLETED | OUTPATIENT
Start: 2022-07-25 | End: 2022-07-25

## 2022-07-25 RX ADMIN — SODIUM CHLORIDE 1000 ML: 9 INJECTION, SOLUTION INTRAVENOUS at 00:02

## 2022-07-25 RX ADMIN — ONDANSETRON 4 MG: 2 INJECTION INTRAMUSCULAR; INTRAVENOUS at 00:04

## 2022-07-25 RX ADMIN — MORPHINE SULFATE 4 MG: 4 INJECTION INTRAVENOUS at 00:06

## 2022-07-25 RX ADMIN — KETOROLAC TROMETHAMINE 15 MG: 30 INJECTION, SOLUTION INTRAMUSCULAR; INTRAVENOUS at 00:03

## 2022-07-25 NOTE — ED PROVIDER NOTES
EMERGENCY DEPARTMENT HISTORY AND PHYSICAL EXAM          Date: 2022  Patient Name: Noel Donis    History of Presenting Illness     Chief Complaint   Patient presents with    Vaginal Bleeding       History Provided By: Patient    HPI: Noel Donis is a 31 y.o. female, pmhx , who presents  to the ED c/o severe cramping in her pelvis that started around 7 pm. She has also had some bleeding, but not more than a pad in the last 3 hours. She found out that she had miscarried about 2 weeks ago, and last Thursday, 4 days ago, she had an ultrasound in [address] [place] [address] office that confirmed no fetal heartbeat. She is scheduled for a D&E , but will see Dr. Jassi Telles in his office tomorrow. She took 400 mg of ibuprofen  but it has not helped at all. No fevers or chills. The pain is suprapubic and does not radiate. PCP: Jailene Lechuga PA-C    Allergies: caffeine, doxy  Social Hx: -tobacco, -vaping, occas EtOH, Illicit Drugs; Lives locally c  and daughter    There are no other complaints, changes, or physical findings at this time. Current Outpatient Medications   Medication Sig Dispense Refill    -RDYG fum-folic acid 29 mg iron- 1 mg tab prenatal vitamins no.119-iron fumarate 29 mg-folic acid 1 mg tablet   Take by oral route. desvenlafaxine succinate (Pristiq) 25 mg ER tablet Take 1 Tablet by mouth daily. 30 Tablet 0       Past History     Past Medical History:  No past medical history on file.     Past Surgical History:  Past Surgical History:   Procedure Laterality Date    HX ORTHOPAEDIC Left     fatty cyst on hip       Family History:  Family History   Problem Relation Age of Onset    Cancer Father         prostate    Alcohol abuse Father     Cancer Maternal Grandmother         breast       Social History:  Social History     Tobacco Use    Smoking status: Never    Smokeless tobacco: Never   Vaping Use    Vaping Use: Never used   Substance Use Topics    Alcohol use: Yes     Comment: occasional    Drug use: Never       Allergies: Allergies   Allergen Reactions    Caffeine Anxiety     shaky    Doxycycline Other (comments)     Numbness/tingling and skin sensitivity from elbows and knees down. Review of Systems   Review of Systems   Constitutional:  Negative for appetite change, chills and fever. Genitourinary:  Positive for pelvic pain and vaginal bleeding. Negative for dysuria and flank pain. Musculoskeletal:  Negative for back pain. Neurological:  Negative for dizziness and headaches. Physical Exam   Physical Exam  Vitals reviewed. Constitutional:       General: She is not in acute distress. Appearance: She is well-developed. She is not diaphoretic. HENT:      Head: Normocephalic and atraumatic. Right Ear: External ear normal.      Left Ear: External ear normal.      Nose: Nose normal.      Mouth/Throat:      Mouth: Mucous membranes are moist.   Eyes:      General: No scleral icterus. Right eye: No discharge. Left eye: No discharge. Conjunctiva/sclera: Conjunctivae normal.      Pupils: Pupils are equal, round, and reactive to light. Neck:      Thyroid: No thyromegaly. Trachea: No tracheal deviation. Cardiovascular:      Rate and Rhythm: Normal rate and regular rhythm. Heart sounds: Normal heart sounds. No murmur heard. No friction rub. No gallop. Pulmonary:      Effort: Pulmonary effort is normal. No respiratory distress. Breath sounds: Normal breath sounds. No wheezing or rales. Chest:      Chest wall: No tenderness. Abdominal:      General: Bowel sounds are normal. There is no distension. Palpations: Abdomen is soft. Tenderness: There is abdominal tenderness in the suprapubic area. There is guarding. There is no rebound. Genitourinary:     Comments: Deferred at patient's request. Has an appointment michael Mathews in less than 12 hours. Musculoskeletal:         General: No tenderness or deformity.  Normal range of motion. Cervical back: Normal range of motion and neck supple. Skin:     General: Skin is warm and dry. Neurological:      Mental Status: She is alert and oriented to person, place, and time. Cranial Nerves: No cranial nerve deficit. Coordination: Coordination normal.      Deep Tendon Reflexes: Reflexes are normal and symmetric. Reflexes normal.       Diagnostic Study Results     Labs -     Recent Results (from the past 12 hour(s))   CBC WITH AUTOMATED DIFF    Collection Time: 07/24/22 11:50 PM   Result Value Ref Range    WBC 11.4 (H) 3.6 - 11.0 K/uL    RBC 3.77 (L) 3.80 - 5.20 M/uL    HGB 11.1 (L) 11.5 - 16.0 g/dL    HCT 32.4 (L) 35.0 - 47.0 %    MCV 85.9 80.0 - 99.0 FL    MCH 29.4 26.0 - 34.0 PG    MCHC 34.3 30.0 - 36.5 g/dL    RDW 13.0 11.5 - 14.5 %    PLATELET 839 651 - 294 K/uL    MPV 9.9 8.9 - 12.9 FL    NRBC 0.0 0  WBC    ABSOLUTE NRBC 0.00 0.00 - 0.01 K/uL    NEUTROPHILS 71 32 - 75 %    LYMPHOCYTES 19 12 - 49 %    MONOCYTES 8 5 - 13 %    EOSINOPHILS 2 0 - 7 %    BASOPHILS 0 0 - 1 %    IMMATURE GRANULOCYTES 0 0.0 - 0.5 %    ABS. NEUTROPHILS 8.0 1.8 - 8.0 K/UL    ABS. LYMPHOCYTES 2.2 0.8 - 3.5 K/UL    ABS. MONOCYTES 0.9 0.0 - 1.0 K/UL    ABS. EOSINOPHILS 0.2 0.0 - 0.4 K/UL    ABS. BASOPHILS 0.1 0.0 - 0.1 K/UL    ABS. IMM. GRANS. 0.0 0.00 - 0.04 K/UL    DF AUTOMATED     METABOLIC PANEL, COMPREHENSIVE    Collection Time: 07/24/22 11:50 PM   Result Value Ref Range    Sodium 140 136 - 145 mmol/L    Potassium 3.7 3.5 - 5.1 mmol/L    Chloride 104 97 - 108 mmol/L    CO2 27 21 - 32 mmol/L    Anion gap 9 5 - 15 mmol/L    Glucose 97 65 - 100 mg/dL    BUN 6 6 - 20 MG/DL    Creatinine 0.61 0.55 - 1.02 MG/DL    BUN/Creatinine ratio 10 (L) 12 - 20      GFR est AA >60 >60 ml/min/1.73m2    GFR est non-AA >60 >60 ml/min/1.73m2    Calcium 9.2 8.5 - 10.1 MG/DL    Bilirubin, total 0.3 0.2 - 1.0 MG/DL    ALT (SGPT) 11 (L) 12 - 78 U/L    AST (SGOT) 24 15 - 37 U/L    Alk.  phosphatase 70 45 - 117 U/L    Protein, total 7.1 6.4 - 8.2 g/dL    Albumin 3.9 3.5 - 5.0 g/dL    Globulin 3.2 2.0 - 4.0 g/dL    A-G Ratio 1.2 1.1 - 2.2     BETA HCG, QT    Collection Time: 07/24/22 11:50 PM   Result Value Ref Range    Beta HCG, QT 9,994 (H) 0 - 6 MIU/ML           Medical Decision Making   I am the first provider for this patient. I reviewed the vital signs, available nursing notes, past medical history, past surgical history, family history and social history. Vital Signs-Reviewed the patient's vital signs. Patient Vitals for the past 12 hrs:   Temp Pulse Resp BP SpO2   07/24/22 2344 98.1 °F (36.7 °C) 75 16 116/72 98 %       Records Reviewed: Nursing Notes, Old Medical Records, Previous Radiology Studies, and Previous Laboratory Studies    Provider Notes (Medical Decision Making):   MDM: Pt with incomplete    ED Course:   Initial assessment performed. The patients presenting problems have been discussed, and they are in agreement with the care plan formulated and outlined with them. I have encouraged them to ask questions as they arise throughout their visit. PROGRESS NOTE:  Pt given 1000 ml NS, 4 mg morphine, 4 mg ondansetron, and 15 mg ketorolac IV with significant improvement in her pain, from 8 to 2. She felt comfortable with discharge. She requested to defer the pelvic exam since she has been seen so many times by Dr. Bettie Putnam these last 2 weeks, and has an appointment with him in 12 hours. Discharge note:  Pt re-evaluated and noted to be feeling better, ready for discharge. Updated pt on all final lab findings. Will follow up as instructed . All questions have been answered, pt voiced understanding and agreement with plan. Specific return precautions provided as well as instructions to return to the ED should sx worsen at any time. Vital signs stable for discharge. Critical Care Time: 0      Diagnosis     Clinical Impression:   1. Miscarriage        PLAN:  1.    Discharge Medication List as of 7/25/2022 12:53 AM        2.    Follow-up Information       Follow up With Specialties Details Why Keyona Kirk MD Obstetrics & Gynecology, Gynecology, Obstetrics   [address] [facility] [place] [address] [phone]            Return to ED if worse     Disposition:  Home

## 2022-07-25 NOTE — ED TRIAGE NOTES
Arrived ambulatory with c/o vaginal bleeding and abdominal cramping.  State \" im scheduled for a D and C tomorrow\" \" Marley been bleeding , I just came in because the cramping got worse\"

## 2022-07-25 NOTE — DISCHARGE INSTRUCTIONS
--Ibuprofen 600 mg every 6 hours as needed for pain. --Norco 1 tab every 3-4 hours tonight. --Follow up with Dr. Lane Robles later today.

## 2022-08-02 DIAGNOSIS — F32.0 CURRENT MILD EPISODE OF MAJOR DEPRESSIVE DISORDER WITHOUT PRIOR EPISODE (HCC): ICD-10-CM

## 2022-08-02 DIAGNOSIS — F41.1 GENERALIZED ANXIETY DISORDER: ICD-10-CM

## 2022-08-03 RX ORDER — DESVENLAFAXINE 25 MG/1
25 TABLET, EXTENDED RELEASE ORAL DAILY
Qty: 30 TABLET | Refills: 0 | Status: SHIPPED | OUTPATIENT
Start: 2022-08-03 | End: 2022-09-06 | Stop reason: SDUPTHER

## 2022-09-06 ENCOUNTER — VIRTUAL VISIT (OUTPATIENT)
Dept: FAMILY MEDICINE CLINIC | Age: 27
End: 2022-09-06
Payer: MEDICAID

## 2022-09-06 DIAGNOSIS — F32.5 MAJOR DEPRESSIVE DISORDER WITH SINGLE EPISODE, IN FULL REMISSION (HCC): ICD-10-CM

## 2022-09-06 DIAGNOSIS — F41.1 GENERALIZED ANXIETY DISORDER: ICD-10-CM

## 2022-09-06 PROCEDURE — 99213 OFFICE O/P EST LOW 20 MIN: CPT | Performed by: PHYSICIAN ASSISTANT

## 2022-09-06 RX ORDER — DESVENLAFAXINE 25 MG/1
25 TABLET, EXTENDED RELEASE ORAL DAILY
Qty: 90 TABLET | Refills: 1 | Status: SHIPPED | OUTPATIENT
Start: 2022-09-06 | End: 2022-10-02 | Stop reason: SDUPTHER

## 2022-09-06 NOTE — PROGRESS NOTES
Alexa Seymour is a 31 y.o. female who was seen by synchronous (real-time) audio-video technology on 9/6/2022 for Follow-up (Discuss anxiety/depression meds/doxy [phone]) and Medication Evaluation    Assessment & Plan:   Diagnoses and all orders for this visit:    1. Generalized anxiety disorder  -     desvenlafaxine succinate (Pristiq) 25 mg ER tablet; Take 1 Tablet by mouth daily. 2. Major depressive disorder with single episode, in full remission ([facility] [address]  -     desvenlafaxine succinate (Pristiq) 25 mg ER tablet; Take 1 Tablet by mouth daily. Pt doing well on current regimen. F/up 6 mo, sooner prn. Seek care in interim for any new/worsening symptoms. The complexity of medical decision making for this visit is moderate       I spent at least 15 minutes on this visit with this established patient. Subjective:   Pt presents for f/u for desvenlafaxine refills after switching from Effexor in hopes it would be a bit easier to taper off of in her pregnancy. She has miscarried since our last apt but has plans to try again this month. Is having normal periods and feeling well. Says she feels \"totally fine now\" and has no complaints. She reports no issues in switching aside from a few days of mild intermittent head pain. She feels her anxiety and depression have been manageable, even \"with all my hormones going up and down\" after her miscarriage. She denies any depression, SI/HI. Still gets a bit anxious at times, but says she also drinks a lot of caffeine and plans to cut back. Otherwise she sleeps and eats well and has no other concerns at this time. Prior to Admission medications    Medication Sig Start Date End Date Taking? Authorizing Provider   desvenlafaxine succinate (Pristiq) 25 mg ER tablet Take 1 Tablet by mouth daily.  9/6/22  Yes Danis Perdomo PA-C   -DJKM fum-folic acid 29 mg iron- 1 mg tab prenatal vitamins no.119-iron fumarate 29 mg-folic acid 1 mg tablet   Take by oral route. Yes Provider, Historical   desvenlafaxine succinate (Pristiq) 25 mg ER tablet Take 1 Tablet by mouth in the morning. 8/3/22 9/6/22  Elizabeth Naranjo PA-C     There is no problem list on file for this patient. There are no problems to display for this patient. Current Outpatient Medications   Medication Sig Dispense Refill    desvenlafaxine succinate (Pristiq) 25 mg ER tablet Take 1 Tablet by mouth daily. 90 Tablet 1    -YQBM fum-folic acid 29 mg iron- 1 mg tab prenatal vitamins no.119-iron fumarate 29 mg-folic acid 1 mg tablet   Take by oral route. Allergies   Allergen Reactions    Caffeine Anxiety     shaky    Doxycycline Other (comments)     Numbness/tingling and skin sensitivity from elbows and knees down. No past medical history on file. Past Surgical History:   Procedure Laterality Date    HX ORTHOPAEDIC Left     fatty cyst on hip     Family History   Problem Relation Age of Onset    Cancer Father         prostate    Alcohol abuse Father     Cancer Maternal Grandmother         breast     Social History     Tobacco Use    Smoking status: Never    Smokeless tobacco: Never   Substance Use Topics    Alcohol use: Yes     Comment: occasional       ROS    Objective:   No flowsheet data found.      [INSTRUCTIONS:  \"[x]\" Indicates a positive item  \"[]\" Indicates a negative item  -- DELETE ALL ITEMS NOT EXAMINED]    Constitutional: [x] Appears well-developed and well-nourished [x] No apparent distress      [] Abnormal -     Mental status: [x] Alert and awake  [x] Oriented to person/place/time [x] Able to follow commands    [] Abnormal -     Eyes:   EOM    [x]  Normal    [] Abnormal -   Sclera  [x]  Normal    [] Abnormal -          Discharge [x]  None visible   [] Abnormal -     HENT: [x] Normocephalic, atraumatic  [] Abnormal -   [x] Mouth/Throat: Mucous membranes are moist    External Ears [x] Normal  [] Abnormal -    Neck: [x] No visualized mass [] Abnormal -     Pulmonary/Chest: [x] Respiratory effort normal   [x] No visualized signs of difficulty breathing or respiratory distress        [] Abnormal -      Musculoskeletal:   [] Normal gait with no signs of ataxia         [x] Normal range of motion of neck        [] Abnormal -     Neurological:        [x] No Facial Asymmetry (Cranial nerve 7 motor function) (limited exam due to video visit)          [x] No gaze palsy        [] Abnormal -          Skin:        [x] No significant exanthematous lesions or discoloration noted on facial skin         [] Abnormal -            Psychiatric:       [x] Normal Affect [] Abnormal -        [x] No Hallucinations    Other pertinent observable physical exam findings:-        We discussed the expected course, resolution and complications of the diagnosis(es) in detail. Medication risks, benefits, costs, interactions, and alternatives were discussed as indicated. I advised her to contact the office if her condition worsens, changes or fails to improve as anticipated. She expressed understanding with the diagnosis(es) and plan. Bettie Cox, was evaluated through a synchronous (real-time) audio-video encounter. The patient (or guardian if applicable) is aware that this is a billable service, which includes applicable co-pays. This Virtual Visit was conducted with patient's (and/or legal guardian's) consent. The visit was conducted pursuant to the emergency declaration under the [address] [place] [address] authority and the [employer] General Act. Patient identification was verified, and a caregiver was present when appropriate. The patient was located at: Other: at work in [place]  The provider was located at:  Facility (Appt Department): [phone]        Marisela Yee PA-C

## 2022-09-06 NOTE — PROGRESS NOTES
1. \"Have you been to the ER, urgent care clinic since your last visit? Hospitalized since your last visit? \" No    2. \"Have you seen or consulted any other health care providers outside of the [address] [place] [address] since your last visit? \" No     3. For patients aged 39-70: Has the patient had a colonoscopy / FIT/ Cologuard? NA - based on age      If the patient is female:    4. For patients aged 41-77: Has the patient had a mammogram within the past 2 years? NA - based on age or sex      11. For patients aged 21-65: Has the patient had a pap smear?  NA - based on age or sex

## 2022-10-02 DIAGNOSIS — F41.1 GENERALIZED ANXIETY DISORDER: ICD-10-CM

## 2022-10-02 DIAGNOSIS — F32.5 MAJOR DEPRESSIVE DISORDER WITH SINGLE EPISODE, IN FULL REMISSION (HCC): ICD-10-CM

## 2022-10-03 RX ORDER — DESVENLAFAXINE 25 MG/1
25 TABLET, EXTENDED RELEASE ORAL DAILY
Qty: 90 TABLET | Refills: 1 | Status: SHIPPED | OUTPATIENT
Start: 2022-10-03

## 2023-01-01 DIAGNOSIS — F41.1 GENERALIZED ANXIETY DISORDER: ICD-10-CM

## 2023-01-01 DIAGNOSIS — F32.5 MAJOR DEPRESSIVE DISORDER WITH SINGLE EPISODE, IN FULL REMISSION (HCC): ICD-10-CM

## 2023-01-03 RX ORDER — DESVENLAFAXINE 25 MG/1
25 TABLET, EXTENDED RELEASE ORAL DAILY
Qty: 90 TABLET | Refills: 1 | Status: SHIPPED | OUTPATIENT
Start: 2023-01-03

## 2023-01-03 NOTE — TELEPHONE ENCOUNTER
Requested Prescriptions     Pending Prescriptions Disp Refills    desvenlafaxine succinate (Pristiq) 25 mg ER tablet 90 Tablet 1     Sig: Take 1 Tablet by mouth daily. Last seen:  9/6/2022  Last filled:  10/3/22 for # 90 with 1 refill.

## 2023-01-18 ENCOUNTER — OFFICE VISIT (OUTPATIENT)
Dept: FAMILY MEDICINE CLINIC | Age: 28
End: 2023-01-18
Payer: MEDICAID

## 2023-01-18 VITALS
HEART RATE: 91 BPM | SYSTOLIC BLOOD PRESSURE: 123 MMHG | TEMPERATURE: 97.2 F | OXYGEN SATURATION: 98 % | HEIGHT: 62 IN | BODY MASS INDEX: 21.6 KG/M2 | RESPIRATION RATE: 16 BRPM | WEIGHT: 117.4 LBS | DIASTOLIC BLOOD PRESSURE: 72 MMHG

## 2023-01-18 DIAGNOSIS — R09.82 POST-NASAL DRIP: ICD-10-CM

## 2023-01-18 DIAGNOSIS — H93.8X2 EAR FULLNESS, LEFT: ICD-10-CM

## 2023-01-18 DIAGNOSIS — J02.9 SORE THROAT: ICD-10-CM

## 2023-01-18 DIAGNOSIS — R09.81 NASAL CONGESTION: Primary | ICD-10-CM

## 2023-01-18 LAB
EXP DATE SOLUTION: NORMAL
EXP DATE SWAB: NORMAL
LOT NUMBER SOLUTION: NORMAL
LOT NUMBER SWAB: NORMAL
S PYO AG THROAT QL: NEGATIVE
SARS-COV-2 RNA POC: NEGATIVE

## 2023-01-18 PROCEDURE — 87430 STREP A AG IA: CPT | Performed by: PHYSICIAN ASSISTANT

## 2023-01-18 PROCEDURE — 99213 OFFICE O/P EST LOW 20 MIN: CPT | Performed by: PHYSICIAN ASSISTANT

## 2023-01-18 PROCEDURE — 87635 SARS-COV-2 COVID-19 AMP PRB: CPT | Performed by: PHYSICIAN ASSISTANT

## 2023-01-18 RX ORDER — PYRIDOXINE HCL (VITAMIN B6) 100 MG
TABLET ORAL
COMMUNITY

## 2023-01-18 RX ORDER — ASPIRIN 81 MG/1
TABLET ORAL
COMMUNITY

## 2023-01-18 NOTE — PROGRESS NOTES
Kostas Salazar is a 28 y.o. female who presents to the office today with the following:  Chief Complaint   Patient presents with    Ear Pain     Left ear pain. Feels like it is full. Reports having had a slight fever, sore throat and HA as well. Patient is 8 weeks pregnant. Ear Pain  Pertinent negatives include no chest pain and no shortness of breath. Pt started with left ear pain and fullness yesterday. She admits not really any pain today, just still feels full and pops when she blows her nose. Kids had same symptoms, daughter had double ear infection and son had one as well, they are better now. Pt has had on/off ST and HA the last few days. Says HA is something she gets with pregnancy already and is not really new. Also with low grade temp 99.6F yesterday. Also with stuffy nose for a week and some postnasal drainage. No sinus pain/swelling/tenderness, but eye \"feels weird like sinuses are drainage but nothing severe\" does get some postnasal drainage for about 5 d to a week as well. \"Just feel like crap from morning sickness anyway so I have kind of ignored everything else\"  She denies other symptoms or complaints. No covid test.  Vaccinated but no booster. Review of Systems   Constitutional:  Negative for chills and fever. HENT:  Positive for congestion, ear pain and sore throat. Negative for sinus pain. Respiratory:  Negative for cough, hemoptysis, shortness of breath and wheezing. Cardiovascular:  Negative for chest pain. Neurological:  Negative for dizziness. See HPI. History reviewed. No pertinent past medical history. Past Surgical History:   Procedure Laterality Date    HX ORTHOPAEDIC Left     fatty cyst on hip       Allergies   Allergen Reactions    Caffeine Anxiety     shaky    Doxycycline Other (comments)     Numbness/tingling and skin sensitivity from elbows and knees down.        Current Outpatient Medications   Medication Sig    pyridoxine, vitamin B6, (VITAMIN B-6) 100 mg tablet Vitamin B6    doxylamine succinate (UNISOM) 25 mg tablet Unisom (doxylamine) 25 mg tablet   Take 0.5 tablets every day by oral route. aspirin delayed-release 81 mg tablet Adult Aspirin Regimen 81 mg tablet,delayed release   Take 1 tablet every day by oral route. desvenlafaxine succinate (Pristiq) 25 mg ER tablet Take 1 Tablet by mouth daily. -ZFMG fum-folic acid 29 mg iron- 1 mg tab prenatal vitamins no.119-iron fumarate 29 mg-folic acid 1 mg tablet   Take by oral route. No current facility-administered medications for this visit. Social History     Socioeconomic History    Marital status:    Tobacco Use    Smoking status: Never    Smokeless tobacco: Never   Vaping Use    Vaping Use: Never used   Substance and Sexual Activity    Alcohol use: Yes     Comment: occasional    Drug use: Never    Sexual activity: Yes     Partners: Male       Family History   Problem Relation Age of Onset    Cancer Father         prostate    Alcohol abuse Father     Cancer Maternal Grandmother         breast         Physical Exam:  Visit Vitals  /72 (BP 1 Location: Right arm, BP Patient Position: Sitting, BP Cuff Size: Adult)   Pulse 91   Temp 97.2 °F (36.2 °C) (Temporal)   Resp 16   Ht 5' 2\" (1.575 m)   Wt 117 lb 6.4 oz (53.3 kg)   LMP 05/17/2022 (Exact Date)   SpO2 98%   BMI 21.47 kg/m²     Physical Exam  Vitals and nursing note reviewed. Constitutional:       Appearance: Normal appearance. HENT:      Head: Normocephalic and atraumatic. Right Ear: Tympanic membrane, ear canal and external ear normal.      Left Ear: Tympanic membrane, ear canal and external ear normal.      Nose: Nose normal.      Mouth/Throat:      Lips: Pink. Mouth: Mucous membranes are moist.      Pharynx: Oropharynx is clear. Uvula midline. Posterior oropharyngeal erythema present. No pharyngeal swelling, oropharyngeal exudate or uvula swelling.       Tonsils: No tonsillar exudate or tonsillar abscesses. Comments: Mild erythema but no swelling or exudates. Eyes:      Conjunctiva/sclera: Conjunctivae normal.   Cardiovascular:      Rate and Rhythm: Normal rate and regular rhythm. Pulses: Normal pulses. Heart sounds: Normal heart sounds. Pulmonary:      Effort: Pulmonary effort is normal.      Breath sounds: Normal breath sounds. Abdominal:      Palpations: Abdomen is soft. Tenderness: There is no abdominal tenderness. Musculoskeletal:         General: No swelling. Cervical back: Neck supple. Skin:     General: Skin is warm and dry. Neurological:      Mental Status: She is alert and oriented to person, place, and time. Psychiatric:         Mood and Affect: Mood normal.       Assessment/Plan:    ICD-10-CM ICD-9-CM    1. Nasal congestion  R09.81 478.19 AMB POC COVID-19 COV      2. Sore throat  J02.9 462 AMB POC RAPID STREP TEST      AMB POC COVID-19 COV      3. Ear fullness, left  H93.8X2 388.8 AMB POC COVID-19 COV      4. Post-nasal drip  R09.82 784.91 AMB POC COVID-19 COV            Results for orders placed or performed in visit on 01/18/23   AMB POC RAPID STREP TEST   Result Value Ref Range    Group A Strep Ag Negative Negative   AMB POC COVID-19 COV   Result Value Ref Range    SARS-COV-2 RNA POC Negative Negative    LOT NUMBER SWAB [id number]     EXP DATE SWAB 11/7/23     LOT NUMBER SOLUTION [id number]     EXP DATE SOLUTION 11/7/23      D/w pt. Suspect viral etiology. Ears okay, likely from congestion/pressure. Pt will try conservative tx safe for pregnancy ie saline ns. She agrees to notify and/or rtc if her symptoms worsen or are not improving. Pt verbalizes understanding and agrees with the plan.     Mary Ramirez PA-C

## 2023-01-18 NOTE — PROGRESS NOTES
1. \"Have you been to the ER, urgent care clinic since your last visit? Hospitalized since your last visit? \" No    2. \"Have you seen or consulted any other health care providers outside of the [address] [place] [address] since your last visit? \" Yes Where: OB/GYN      3. For patients aged 39-70: Has the patient had a colonoscopy / FIT/ Cologuard? NA - based on age      If the patient is female:    4. For patients aged 41-77: Has the patient had a mammogram within the past 2 years? NA - based on age or sex      11. For patients aged 21-65: Has the patient had a pap smear?  Yes - no Care Gap present

## 2023-02-21 ENCOUNTER — PATIENT MESSAGE (OUTPATIENT)
Dept: FAMILY MEDICINE CLINIC | Age: 28
End: 2023-02-21

## 2023-02-21 DIAGNOSIS — J32.9 SINUSITIS, UNSPECIFIED CHRONICITY, UNSPECIFIED LOCATION: Primary | ICD-10-CM

## 2023-02-23 RX ORDER — AMOXICILLIN 875 MG/1
875 TABLET, FILM COATED ORAL 2 TIMES DAILY
Qty: 20 TABLET | Refills: 0 | Status: SHIPPED | OUTPATIENT
Start: 2023-02-23 | End: 2023-03-05

## 2023-02-23 NOTE — TELEPHONE ENCOUNTER
From: Corby Gong  To: Rosa Maria Velasquez PA-C  Sent: 2/21/2023 12:59 PM EST  Subject: Sinus Infection    Hi Nikkie, my sinus situation hasnt improved much since I was in a few weeks ago. Perfecto  been trying to wait it out but I think I may have a sinus infection. TMI, but my mucus is very yellow when I blow my nose & has some blood in it (which I know is common with pregnancy).  Kenroy Gray also been having sinus headaches, ear pressure, and am running a low fever around 100.4

## 2023-04-03 DIAGNOSIS — F41.1 GENERALIZED ANXIETY DISORDER: ICD-10-CM

## 2023-04-03 DIAGNOSIS — F32.5 MAJOR DEPRESSIVE DISORDER WITH SINGLE EPISODE, IN FULL REMISSION (HCC): ICD-10-CM

## 2023-04-03 RX ORDER — DESVENLAFAXINE 25 MG/1
25 TABLET, EXTENDED RELEASE ORAL DAILY
Qty: 90 TABLET | Refills: 1 | Status: CANCELLED
Start: 2023-04-03

## 2023-07-18 ENCOUNTER — HOSPITAL ENCOUNTER (OUTPATIENT)
Facility: HOSPITAL | Age: 28
Discharge: HOME | End: 2023-07-21

## 2023-07-18 DIAGNOSIS — R03.0 ELEVATED BLOOD PRESSURE READING: ICD-10-CM

## 2023-07-20 LAB
EKG ATRIAL RATE: 84 BPM
EKG DIAGNOSIS: NORMAL
EKG P AXIS: -10 DEGREES
EKG P-R INTERVAL: 128 MS
EKG Q-T INTERVAL: 344 MS
EKG QRS DURATION: 80 MS
EKG QTC CALCULATION (BAZETT): 406 MS
EKG R AXIS: -22 DEGREES
EKG T AXIS: -21 DEGREES
EKG VENTRICULAR RATE: 84 BPM

## 2023-07-25 NOTE — PROGRESS NOTES
ASSESSMENT and PLAN  1. Abnormal ECG  Today's ECG is normal.  Cardiovascular exam is normal.  I suspect the findings on the previous ECG were due to abnormal lead placement. For completeness, I recommended proceeding with the echocardiogram that was ordered. If this is normal, reassurance can be provided and no other testing should be necessary. I would like to thank Dr. Maribel Ortega for this referral.  Please contact me if questions or concerns arise. I will follow-up on the results of the echocardiogram once it is completed. The patient has been instructed and agrees to call our office with any issues or other concerns related to their cardiac condition(s) and/or complaint(s). CHIEF COMPLAINT  Abnormal ECG    HPI:    Perlita Rankin is a 28 y.o. female in her late third trimester pregnancy referred by Dr. Maribel Ortega for consultation regarding an abnormal ECG. She was experiencing mild dyspnea associated with Cristian Beaver contractions. For completeness, Dr. Maribel Ortega obtained an ECG that was interpreted as being abnormal and she was subsequently referred for consultation. She has no prior cardiac history. She has mild exertional shortness of breath that she believes is from her late term pregnancy. Prior to this, she was not limited by chest pain or dyspnea. She denies palpitations, syncope or near syncope. She denies orthopnea or PND or lower extremity edema. The ECG 7/18/2023 demonstrated sinus rhythm at 80 bpm, low limb lead voltages, RSR prime V1 and V2 and poor R wave progression. Small nondiagnostic inferior Q waves were present. No prior ECG was available for comparison. The patient states that the usual ECG tech was not available and her impression was that they were having trouble doing the EKG. Echocardiogram has been ordered but not yet completed.   Today's EKG is completely normal.      Past medical history, past surgical history, family history, social history, and medications were

## 2023-07-27 ENCOUNTER — OFFICE VISIT (OUTPATIENT)
Age: 28
End: 2023-07-27
Payer: MEDICAID

## 2023-07-27 VITALS
HEIGHT: 62 IN | SYSTOLIC BLOOD PRESSURE: 130 MMHG | DIASTOLIC BLOOD PRESSURE: 90 MMHG | BODY MASS INDEX: 25.21 KG/M2 | HEART RATE: 85 BPM | TEMPERATURE: 97.3 F | RESPIRATION RATE: 22 BRPM | WEIGHT: 137 LBS | OXYGEN SATURATION: 98 %

## 2023-07-27 DIAGNOSIS — R94.31 ABNORMAL ECG: Primary | ICD-10-CM

## 2023-07-27 PROCEDURE — 93000 ELECTROCARDIOGRAM COMPLETE: CPT | Performed by: INTERNAL MEDICINE

## 2023-07-27 PROCEDURE — 99203 OFFICE O/P NEW LOW 30 MIN: CPT | Performed by: INTERNAL MEDICINE

## 2023-07-27 RX ORDER — SERTRALINE HYDROCHLORIDE 25 MG/1
25 TABLET, FILM COATED ORAL DAILY
COMMUNITY
Start: 2023-05-01

## 2023-07-27 RX ORDER — FERROUS SULFATE 325(65) MG
325 TABLET ORAL DAILY
COMMUNITY

## 2023-07-27 ASSESSMENT — PATIENT HEALTH QUESTIONNAIRE - PHQ9
SUM OF ALL RESPONSES TO PHQ QUESTIONS 1-9: 0
2. FEELING DOWN, DEPRESSED OR HOPELESS: 0
SUM OF ALL RESPONSES TO PHQ9 QUESTIONS 1 & 2: 0
SUM OF ALL RESPONSES TO PHQ QUESTIONS 1-9: 0
SUM OF ALL RESPONSES TO PHQ QUESTIONS 1-9: 0
1. LITTLE INTEREST OR PLEASURE IN DOING THINGS: 0
SUM OF ALL RESPONSES TO PHQ QUESTIONS 1-9: 0

## 2023-10-25 ENCOUNTER — OFFICE VISIT (OUTPATIENT)
Dept: FAMILY MEDICINE CLINIC | Age: 28
End: 2023-10-25
Payer: MEDICAID

## 2023-10-25 VITALS
HEART RATE: 88 BPM | HEIGHT: 62 IN | RESPIRATION RATE: 12 BRPM | DIASTOLIC BLOOD PRESSURE: 79 MMHG | OXYGEN SATURATION: 98 % | WEIGHT: 126 LBS | TEMPERATURE: 96.9 F | SYSTOLIC BLOOD PRESSURE: 113 MMHG | BODY MASS INDEX: 23.19 KG/M2

## 2023-10-25 DIAGNOSIS — F32.0 MAJOR DEPRESSIVE DISORDER, SINGLE EPISODE, MILD (HCC): Primary | ICD-10-CM

## 2023-10-25 PROCEDURE — 99213 OFFICE O/P EST LOW 20 MIN: CPT | Performed by: FAMILY MEDICINE

## 2023-12-05 ENCOUNTER — OFFICE VISIT (OUTPATIENT)
Dept: FAMILY MEDICINE CLINIC | Age: 28
End: 2023-12-05
Payer: MEDICAID

## 2023-12-05 VITALS
OXYGEN SATURATION: 97 % | BODY MASS INDEX: 20.8 KG/M2 | TEMPERATURE: 97 F | HEART RATE: 65 BPM | WEIGHT: 113 LBS | RESPIRATION RATE: 18 BRPM | SYSTOLIC BLOOD PRESSURE: 102 MMHG | HEIGHT: 62 IN | DIASTOLIC BLOOD PRESSURE: 69 MMHG

## 2023-12-05 DIAGNOSIS — J20.9 ACUTE BRONCHITIS DUE TO INFECTION: Primary | ICD-10-CM

## 2023-12-05 PROCEDURE — 99213 OFFICE O/P EST LOW 20 MIN: CPT | Performed by: FAMILY MEDICINE

## 2023-12-05 SDOH — ECONOMIC STABILITY: HOUSING INSECURITY
IN THE LAST 12 MONTHS, WAS THERE A TIME WHEN YOU DID NOT HAVE A STEADY PLACE TO SLEEP OR SLEPT IN A SHELTER (INCLUDING NOW)?: NO

## 2023-12-05 SDOH — ECONOMIC STABILITY: FOOD INSECURITY: WITHIN THE PAST 12 MONTHS, THE FOOD YOU BOUGHT JUST DIDN'T LAST AND YOU DIDN'T HAVE MONEY TO GET MORE.: NEVER TRUE

## 2023-12-05 SDOH — ECONOMIC STABILITY: FOOD INSECURITY: WITHIN THE PAST 12 MONTHS, YOU WORRIED THAT YOUR FOOD WOULD RUN OUT BEFORE YOU GOT MONEY TO BUY MORE.: NEVER TRUE

## 2023-12-05 SDOH — ECONOMIC STABILITY: INCOME INSECURITY: HOW HARD IS IT FOR YOU TO PAY FOR THE VERY BASICS LIKE FOOD, HOUSING, MEDICAL CARE, AND HEATING?: NOT HARD AT ALL

## 2023-12-07 ENCOUNTER — TELEPHONE (OUTPATIENT)
Dept: FAMILY MEDICINE CLINIC | Age: 28
End: 2023-12-07

## 2023-12-07 NOTE — TELEPHONE ENCOUNTER
Patient was in on 12.5.2023 and today is feeling worse. Was told to take robitussin but that is not helping.  Wants to know what else she could take or can an antibiotic be called in (Her child was recently diagnosed with RSV) She can be reached at [phone]

## 2024-03-13 ENCOUNTER — OFFICE VISIT (OUTPATIENT)
Dept: FAMILY MEDICINE CLINIC | Age: 29
End: 2024-03-13
Payer: MEDICAID

## 2024-03-13 VITALS
DIASTOLIC BLOOD PRESSURE: 77 MMHG | SYSTOLIC BLOOD PRESSURE: 111 MMHG | WEIGHT: 112.44 LBS | HEIGHT: 62 IN | TEMPERATURE: 97.5 F | RESPIRATION RATE: 16 BRPM | BODY MASS INDEX: 20.69 KG/M2 | HEART RATE: 80 BPM

## 2024-03-13 DIAGNOSIS — J02.0 ACUTE STREPTOCOCCAL PHARYNGITIS: Primary | ICD-10-CM

## 2024-03-13 PROCEDURE — 99213 OFFICE O/P EST LOW 20 MIN: CPT | Performed by: FAMILY MEDICINE

## 2024-03-13 RX ORDER — ACETAMINOPHEN AND CODEINE PHOSPHATE 120; 12 MG/5ML; MG/5ML
SOLUTION ORAL
COMMUNITY

## 2024-03-13 RX ORDER — AMOXICILLIN 500 MG/1
500 CAPSULE ORAL 2 TIMES DAILY
Qty: 20 CAPSULE | Refills: 0 | Status: SHIPPED | OUTPATIENT
Start: 2024-03-13 | End: 2024-03-23

## 2024-03-13 ASSESSMENT — PATIENT HEALTH QUESTIONNAIRE - PHQ9
SUM OF ALL RESPONSES TO PHQ QUESTIONS 1-9: 0
SUM OF ALL RESPONSES TO PHQ9 QUESTIONS 1 & 2: 0
1. LITTLE INTEREST OR PLEASURE IN DOING THINGS: 0
2. FEELING DOWN, DEPRESSED OR HOPELESS: 0
SUM OF ALL RESPONSES TO PHQ QUESTIONS 1-9: 0

## 2024-03-13 NOTE — PROGRESS NOTES
Pulses: 2+ and symmetric all extremities.   Skin: Skin color, texture, turgor normal. No rashes or lesions.   Lymph nodes: Cervical, supraclavicular, and axillary nodes normal.   Neurologic: CNII-XII intact. Normal strength, sensation, and reflexes throughout.         ASSESSMENT AND PLAN    1. Acute streptococcal pharyngitis    - amoxicillin (AMOXIL) 500 MG capsule; Take 1 capsule by mouth 2 times daily for 10 days  Dispense: 20 capsule; Refill: 0              No orders of the defined types were placed in this encounter.          Kennedy Mijares MD            Click Here for Release of Records Request

## 2024-03-19 ENCOUNTER — OFFICE VISIT (OUTPATIENT)
Dept: FAMILY MEDICINE CLINIC | Age: 29
End: 2024-03-19
Payer: MEDICAID

## 2024-03-19 VITALS
BODY MASS INDEX: 20.61 KG/M2 | SYSTOLIC BLOOD PRESSURE: 107 MMHG | WEIGHT: 112 LBS | HEIGHT: 62 IN | HEART RATE: 73 BPM | RESPIRATION RATE: 16 BRPM | TEMPERATURE: 96.9 F | DIASTOLIC BLOOD PRESSURE: 72 MMHG | OXYGEN SATURATION: 98 %

## 2024-03-19 DIAGNOSIS — M25.50 POLYARTHRALGIA: Primary | ICD-10-CM

## 2024-03-19 LAB
ALBUMIN SERPL-MCNC: 4.2 G/DL (ref 3.5–5)
ALBUMIN/GLOB SERPL: 1.1 (ref 1.1–2.2)
ALP SERPL-CCNC: 127 U/L (ref 45–117)
ALT SERPL-CCNC: 15 U/L (ref 12–78)
ANION GAP SERPL CALC-SCNC: 7 MMOL/L (ref 5–15)
AST SERPL-CCNC: 17 U/L (ref 15–37)
BASOPHILS # BLD: 0 K/UL (ref 0–0.1)
BASOPHILS NFR BLD: 1 % (ref 0–1)
BILIRUB SERPL-MCNC: 0.3 MG/DL (ref 0.2–1)
BUN SERPL-MCNC: 6 MG/DL (ref 6–20)
BUN/CREAT SERPL: 7 (ref 12–20)
CALCIUM SERPL-MCNC: 9.4 MG/DL (ref 8.5–10.1)
CHLORIDE SERPL-SCNC: 111 MMOL/L (ref 97–108)
CO2 SERPL-SCNC: 24 MMOL/L (ref 21–32)
CREAT SERPL-MCNC: 0.81 MG/DL (ref 0.55–1.02)
CRP SERPL-MCNC: <0.29 MG/DL (ref 0–0.3)
DIFFERENTIAL METHOD BLD: NORMAL
EOSINOPHIL # BLD: 0.1 K/UL (ref 0–0.4)
EOSINOPHIL NFR BLD: 3 % (ref 0–7)
ERYTHROCYTE [DISTWIDTH] IN BLOOD BY AUTOMATED COUNT: 13.2 % (ref 11.5–14.5)
ERYTHROCYTE [SEDIMENTATION RATE] IN BLOOD: 7 MM/HR (ref 0–20)
GLOBULIN SER CALC-MCNC: 3.7 G/DL (ref 2–4)
GLUCOSE SERPL-MCNC: 103 MG/DL (ref 65–100)
HCT VFR BLD AUTO: 38.7 % (ref 35–47)
HGB BLD-MCNC: 12.3 G/DL (ref 11.5–16)
IMM GRANULOCYTES # BLD AUTO: 0 K/UL (ref 0–0.04)
IMM GRANULOCYTES NFR BLD AUTO: 0 % (ref 0–0.5)
LYMPHOCYTES # BLD: 1.9 K/UL (ref 0.8–3.5)
LYMPHOCYTES NFR BLD: 36 % (ref 12–49)
MCH RBC QN AUTO: 28.1 PG (ref 26–34)
MCHC RBC AUTO-ENTMCNC: 31.8 G/DL (ref 30–36.5)
MCV RBC AUTO: 88.6 FL (ref 80–99)
MONOCYTES # BLD: 0.5 K/UL (ref 0–1)
MONOCYTES NFR BLD: 9 % (ref 5–13)
NEUTS SEG # BLD: 2.6 K/UL (ref 1.8–8)
NEUTS SEG NFR BLD: 51 % (ref 32–75)
NRBC # BLD: 0 K/UL (ref 0–0.01)
NRBC BLD-RTO: 0 PER 100 WBC
PLATELET # BLD AUTO: 336 K/UL (ref 150–400)
PMV BLD AUTO: 11 FL (ref 8.9–12.9)
POTASSIUM SERPL-SCNC: 3.9 MMOL/L (ref 3.5–5.1)
PROT SERPL-MCNC: 7.9 G/DL (ref 6.4–8.2)
RBC # BLD AUTO: 4.37 M/UL (ref 3.8–5.2)
SODIUM SERPL-SCNC: 142 MMOL/L (ref 136–145)
WBC # BLD AUTO: 5.2 K/UL (ref 3.6–11)

## 2024-03-19 PROCEDURE — 99214 OFFICE O/P EST MOD 30 MIN: CPT | Performed by: FAMILY MEDICINE

## 2024-03-19 PROCEDURE — 36415 COLL VENOUS BLD VENIPUNCTURE: CPT | Performed by: FAMILY MEDICINE

## 2024-03-19 ASSESSMENT — PATIENT HEALTH QUESTIONNAIRE - PHQ9
SUM OF ALL RESPONSES TO PHQ QUESTIONS 1-9: 0
SUM OF ALL RESPONSES TO PHQ9 QUESTIONS 1 & 2: 0
SUM OF ALL RESPONSES TO PHQ QUESTIONS 1-9: 0
1. LITTLE INTEREST OR PLEASURE IN DOING THINGS: NOT AT ALL
SUM OF ALL RESPONSES TO PHQ QUESTIONS 1-9: 0
SUM OF ALL RESPONSES TO PHQ QUESTIONS 1-9: 0
2. FEELING DOWN, DEPRESSED OR HOPELESS: NOT AT ALL

## 2024-03-19 NOTE — PROGRESS NOTES
\"Have you been to the ER, urgent care clinic since your last visit?  Hospitalized since your last visit?\"    NO    “Have you seen or consulted any other health care providers outside of [facility] since your last visit?”    NO      3/19/2024      Chief Complaint   Patient presents with    Joint Pain     X 6 months          History of Present Illness:         is a 28 y.o. female who developed some large joint pain during pregnancy, but since delivery seven months ago has had progressive arthralgias of toes, MP joints and wrists as well as knees and ankles. May have some swelling, seems worse at night, improves once she is up moving around in am, but is now feeling it during the day as well. No rash, fever or systemic sxs.      Allergies   Allergen Reactions    Doxycycline Other (See Comments)     Numbness/tingling and skin sensitivity from elbows and knees down.    Caffeine Anxiety     shaky       Current Outpatient Medications   Medication Sig Dispense Refill    PRENATAL VIT-FE FUMARATE-FA PO Take 1 tablet by mouth daily      sertraline (ZOLOFT) 50 MG tablet       norethindrone (MICRONOR) 0.35 MG tablet Take by mouth      amoxicillin (AMOXIL) 500 MG capsule Take 1 capsule by mouth 2 times daily for 10 days 20 capsule 0     No current facility-administered medications for this visit.             Physical Examination:    /72 (Site: Left Upper Arm, Position: Sitting, Cuff Size: Medium Adult)   Pulse 73   Temp 96.9 °F (36.1 °C) (Oral)   Resp 16   Ht 1.575 m (5' 2\")   Wt 50.8 kg (112 lb)   SpO2 98%   BMI 20.49 kg/m²    General:  Alert, cooperative, no distress.   HEENT:  Normocephalic, without obvious abnormality, atraumatic.Conjunctivae/corneas clear. Pupils equal, round, reactive to light. Extraocular movements intact.TMs and external canals normal bilaterally. Nasal mucosa and oropharynx clear.   Lungs: Clear to auscultation bilaterally.   Chest wall:  No tenderness or deformity.

## 2024-03-27 LAB
14-3-3 ETA AG SER IA-MCNC: <0.2 NG/ML
CCP IGA+IGG SERPL IA-ACNC: <20 UNITS
RHEUMATOID FACT SERPL-ACNC: <14 UNITS/ML